# Patient Record
Sex: FEMALE | Race: WHITE | NOT HISPANIC OR LATINO
[De-identification: names, ages, dates, MRNs, and addresses within clinical notes are randomized per-mention and may not be internally consistent; named-entity substitution may affect disease eponyms.]

---

## 2020-05-11 ENCOUNTER — TRANSCRIPTION ENCOUNTER (OUTPATIENT)
Age: 55
End: 2020-05-11

## 2020-10-22 PROBLEM — Z00.00 ENCOUNTER FOR PREVENTIVE HEALTH EXAMINATION: Status: ACTIVE | Noted: 2020-10-22

## 2020-10-27 ENCOUNTER — APPOINTMENT (OUTPATIENT)
Dept: ORTHOPEDIC SURGERY | Facility: CLINIC | Age: 55
End: 2020-10-27
Payer: COMMERCIAL

## 2020-10-27 ENCOUNTER — TRANSCRIPTION ENCOUNTER (OUTPATIENT)
Age: 55
End: 2020-10-27

## 2020-10-27 DIAGNOSIS — M53.3 SACROCOCCYGEAL DISORDERS, NOT ELSEWHERE CLASSIFIED: ICD-10-CM

## 2020-10-27 DIAGNOSIS — Z78.9 OTHER SPECIFIED HEALTH STATUS: ICD-10-CM

## 2020-10-27 DIAGNOSIS — F17.200 NICOTINE DEPENDENCE, UNSPECIFIED, UNCOMPLICATED: ICD-10-CM

## 2020-10-27 DIAGNOSIS — M54.5 LOW BACK PAIN: ICD-10-CM

## 2020-10-27 PROCEDURE — 72100 X-RAY EXAM L-S SPINE 2/3 VWS: CPT

## 2020-10-27 PROCEDURE — 99072 ADDL SUPL MATRL&STAF TM PHE: CPT

## 2020-10-27 PROCEDURE — 99204 OFFICE O/P NEW MOD 45 MIN: CPT

## 2020-10-27 RX ORDER — PNV NO.95/FERROUS FUM/FOLIC AC 28MG-0.8MG
TABLET ORAL
Refills: 0 | Status: ACTIVE | COMMUNITY

## 2020-10-27 NOTE — DISCUSSION/SUMMARY
[de-identified] : Discussed findings of today's exam and possible causes of patient's pain.  Educated patient on their most probable diagnosis of chronic low back pain due to SI joint dysfunction and paraspinal muscle spasm.  Reviewed possible courses of treatment, and we collaboratively decided best course of treatment at this time will include conservative management.  Patient advised while she may have mild degenerative disc disease, she has no significant degenerative findings on x-ray, and no radiculopathy based on history or clinical exam.  Patient will be started on a course of physical therapy to restore normal range of motion and strength as tolerated.  We discussed appropriate activity modification and lifting mechanics.  If patient has persisting pain and localized SI joint dysfunction may consider diagnostic/therapeutic ultrasound-guided cortisone injection as a future treatment option.  Follow up as needed.  Patient appreciates and agrees with current plan.\par \par This note was generated using dragon medical dictation software.  A reasonable effort has been made for proofreading its contents, but typos may still remain.  If there are any questions or points of clarification needed please notify my office.

## 2020-10-27 NOTE — HISTORY OF PRESENT ILLNESS
[de-identified] : Patient is here for LBP that began about 2 years ago without inciting injury. She works as a  for [employer] and is constantly picking up heavy bags. She has pain in her back and into her left buttock. She has used Aleve, Tylenol, heat patches for pain relief. She was not working for several months due to the COVID-19 crisis and did not notice relief of her pain. Once she is moving, her pain is tolerable, but the initial getting up is what worsens her pain. She has not had any imaging or PT for this issue. Denies N/T/R/Prior injury. \par \par The patient's past medical history, past surgical history, medications and allergies were reviewed by me today and documented accordingly. In addition, the patient's family and social history, which were noncontributory to this visit, were reviewed also. Intake form was reviewed. The patient has no family history of arthritis.

## 2020-10-27 NOTE — PHYSICAL EXAM
[de-identified] : Constitutional: Well-nourished, well-developed, No acute distress\par Respiratory:  Good respiratory effort, no SOB\par Lymphatic: No regional lymphadenopathy, no lymphedema\par Psychiatric: Pleasant and normal affect, alert and oriented x3\par Skin: Clean dry and intact B/L UE/LE\par Musculoskeletal: normal except where as noted in regional exam\par \par Lumbar Spine Exam\par \par APPEARANCE: no marked deformities or malalignment, normal curvature of the lumbosacral spine. no marked deformities. good posture\par POSITIVE TENDERNESS: + Bilateral SI joints, bilateral lumbar tenderness and spasm noted in erector spinae and quadratus lumborum\par NONTENDER: no bony midline tenderness\par ROM: full, although painful at end range of flexion and extension\par RESISTIVE TESTING: painless 5/5 resisted flex/ext, sidebending b/l, and rotation\par SPECIAL TESTS: neg SLR b/l, neg RACHEL b/l, neg Trendelenburg b/l \par PULSES: 2+ DP/PT pulses\par NEURO:  L1 - S2 intact to sensation and motor, DTRs 2+/4 patella and achilles\par \par B/L Hips: No asymmetry, malalignment, or swelling, Full ROM, 5/5 strength in flexion/ext, IR/ER, Abd/Add, Joints stable\par B/L Knees: No asymmetry, malalignment, or swelling, Full ROM, 5/5 strength in flexion/ext, Joints stable\par B/L Ankles: No asymmetry, malalignment, or swelling, Full ROM, 5/5 strength in DF/PF/Inv/Ev, Joints stable\par BIOMECHANICAL EXAM: no marked leg length discrepancy, moderate hip abductor weakness b/l, no marked foot pronation, Normal gait and station\par \par  [de-identified] : The following radiographs were ordered and read by me during this patient's visit. I reviewed each radiograph in detail with the patient and discussed the findings as highlighted below. \par \par 2 views of the lumbar spine were obtained today that show degenerative changes and evidence of minimal osteoarthritis.  No fracture, or dislocation seen at this time. There is no malalignment. No other obvious osseous abnormality. Otherwise unremarkable.

## 2021-05-12 ENCOUNTER — TRANSCRIPTION ENCOUNTER (OUTPATIENT)
Age: 56
End: 2021-05-12

## 2021-06-15 ENCOUNTER — NON-APPOINTMENT (OUTPATIENT)
Age: 56
End: 2021-06-15

## 2021-06-15 ENCOUNTER — APPOINTMENT (OUTPATIENT)
Dept: CARDIOLOGY | Facility: CLINIC | Age: 56
End: 2021-06-15
Payer: COMMERCIAL

## 2021-06-15 VITALS
WEIGHT: 133 LBS | OXYGEN SATURATION: 99 % | BODY MASS INDEX: 24.48 KG/M2 | HEIGHT: 62 IN | DIASTOLIC BLOOD PRESSURE: 101 MMHG | HEART RATE: 74 BPM | SYSTOLIC BLOOD PRESSURE: 148 MMHG

## 2021-06-15 PROCEDURE — 99072 ADDL SUPL MATRL&STAF TM PHE: CPT

## 2021-06-15 PROCEDURE — 93000 ELECTROCARDIOGRAM COMPLETE: CPT

## 2021-06-15 PROCEDURE — 99204 OFFICE O/P NEW MOD 45 MIN: CPT

## 2021-06-15 NOTE — HISTORY OF PRESENT ILLNESS
[FreeTextEntry1] : Michelle is a 55-year-old female here for evaluation.  She is generally healthy, and does not take any medications.\par \par She is a current smoker, now smoking about half a pack per day.  She started when she was 16.  She reports a family history of congestive heart failure and coronary artery disease in her mother, though she passed away in her 90s.\par \par She is quite active at work, and works in the [employer].  She is always moving around and lifting heavy bags, and denies a decrease in exercise tolerance, dyspnea on exertion and chest pain.  She has no palpitations, lower extremity swelling, orthopnea, PND, dizziness, lightheadedness, and near syncope.\par \par She reports that her cholesterol was borderline elevated, though the results are not available.\par She works odd hours, and usually gets up at 2 AM, and at work by 4.  She does report a history of insomnia, and currently occasionally takes Tylenol PM and melatonin.

## 2021-06-15 NOTE — DISCUSSION/SUMMARY
[FreeTextEntry1] : Theodora is a 55-year-old female here for initial evaluation.  She has no worrisome cardiac symptoms at this time, though her blood pressure is quite elevated.  She does not seem excessively nervous or anxious.  Her EKG demonstrates a sinus rhythm, with an incomplete right bundle branch block, though no sign of ischemia.  Her physical exam is unremarkable.\par \par Given the elevation in her blood pressure, we have decided to start her on amlodipine 2.5.  She is going to purchase a blood pressure cuff, and monitor her numbers at home.  She will have a routine echocardiogram to confirm the absence of structural heart disease.  I stressed the importance of diet, exercise, and weight loss, to reduce her overall cardiovascular risk.  I requested a copy of her most recent cholesterol.  We also discussed the necessity of smoking cessation, though she is not ready to try medication like Chantix.  We will speak after the above testing, and arrange follow-up in 4 to 6 weeks.

## 2021-07-06 ENCOUNTER — APPOINTMENT (OUTPATIENT)
Dept: CARDIOLOGY | Facility: CLINIC | Age: 56
End: 2021-07-06
Payer: COMMERCIAL

## 2021-07-06 PROCEDURE — 99072 ADDL SUPL MATRL&STAF TM PHE: CPT

## 2021-07-06 PROCEDURE — 93306 TTE W/DOPPLER COMPLETE: CPT

## 2021-08-16 ENCOUNTER — TRANSCRIPTION ENCOUNTER (OUTPATIENT)
Age: 56
End: 2021-08-16

## 2021-08-16 LAB
25(OH)D3 SERPL-MCNC: 31 NG/ML
ALBUMIN SERPL ELPH-MCNC: 4.4 G/DL
ALP BLD-CCNC: 78 U/L
ALT SERPL-CCNC: 13 U/L
ANION GAP SERPL CALC-SCNC: 10 MMOL/L
AST SERPL-CCNC: 18 U/L
BASOPHILS # BLD AUTO: 0.05 K/UL
BASOPHILS NFR BLD AUTO: 0.6 %
BILIRUB SERPL-MCNC: 0.8 MG/DL
BUN SERPL-MCNC: 8 MG/DL
CALCIUM SERPL-MCNC: 9.7 MG/DL
CHLORIDE SERPL-SCNC: 104 MMOL/L
CHOLEST SERPL-MCNC: 223 MG/DL
CO2 SERPL-SCNC: 25 MMOL/L
CREAT SERPL-MCNC: 0.88 MG/DL
EOSINOPHIL # BLD AUTO: 0.14 K/UL
EOSINOPHIL NFR BLD AUTO: 1.7 %
GLUCOSE SERPL-MCNC: 89 MG/DL
HCT VFR BLD CALC: 45.5 %
HDLC SERPL-MCNC: 72 MG/DL
HGB BLD-MCNC: 15.1 G/DL
IMM GRANULOCYTES NFR BLD AUTO: 0.2 %
LDLC SERPL CALC-MCNC: 136 MG/DL
LYMPHOCYTES # BLD AUTO: 2.49 K/UL
LYMPHOCYTES NFR BLD AUTO: 30.4 %
MAN DIFF?: NORMAL
MCHC RBC-ENTMCNC: 32.1 PG
MCHC RBC-ENTMCNC: 33.2 GM/DL
MCV RBC AUTO: 96.6 FL
MONOCYTES # BLD AUTO: 0.69 K/UL
MONOCYTES NFR BLD AUTO: 8.4 %
NEUTROPHILS # BLD AUTO: 4.81 K/UL
NEUTROPHILS NFR BLD AUTO: 58.7 %
NONHDLC SERPL-MCNC: 150 MG/DL
PLATELET # BLD AUTO: 306 K/UL
POTASSIUM SERPL-SCNC: 5 MMOL/L
PROT SERPL-MCNC: 6.8 G/DL
RBC # BLD: 4.71 M/UL
RBC # FLD: 12.8 %
SODIUM SERPL-SCNC: 140 MMOL/L
TRIGL SERPL-MCNC: 72 MG/DL
TSH SERPL-ACNC: 0.55 UIU/ML
WBC # FLD AUTO: 8.2 K/UL

## 2021-08-18 LAB
ESTIMATED AVERAGE GLUCOSE: 111 MG/DL
HBA1C MFR BLD HPLC: 5.5 %

## 2021-08-25 ENCOUNTER — NON-APPOINTMENT (OUTPATIENT)
Age: 56
End: 2021-08-25

## 2021-08-25 ENCOUNTER — APPOINTMENT (OUTPATIENT)
Dept: CARDIOLOGY | Facility: CLINIC | Age: 56
End: 2021-08-25
Payer: COMMERCIAL

## 2021-08-25 VITALS
HEART RATE: 84 BPM | HEIGHT: 62 IN | WEIGHT: 132 LBS | BODY MASS INDEX: 24.29 KG/M2 | OXYGEN SATURATION: 99 % | SYSTOLIC BLOOD PRESSURE: 126 MMHG | DIASTOLIC BLOOD PRESSURE: 84 MMHG

## 2021-08-25 DIAGNOSIS — I65.29 OCCLUSION AND STENOSIS OF UNSPECIFIED CAROTID ARTERY: ICD-10-CM

## 2021-08-25 DIAGNOSIS — I10 ESSENTIAL (PRIMARY) HYPERTENSION: ICD-10-CM

## 2021-08-25 DIAGNOSIS — R94.31 ABNORMAL ELECTROCARDIOGRAM [ECG] [EKG]: ICD-10-CM

## 2021-08-25 DIAGNOSIS — E78.5 HYPERLIPIDEMIA, UNSPECIFIED: ICD-10-CM

## 2021-08-25 PROCEDURE — 93000 ELECTROCARDIOGRAM COMPLETE: CPT

## 2021-08-25 PROCEDURE — 99214 OFFICE O/P EST MOD 30 MIN: CPT

## 2021-08-25 NOTE — DISCUSSION/SUMMARY
[FreeTextEntry1] : Theodora is a 56-year-old female here for follow-up.  She feels well, and her blood pressure is much better controlled.  Her EKG is unchanged, without worrisome findings.  Her physical exam is unremarkable.\par \par She will continue amlodipine 5 for hypertension.  Her echocardiogram demonstrated normal left ventricular systolic function, without worrisome valvular pathology.  Most recent LDL was 136 and we discussed the need for improvement in her diet and exercise.  She is going to have a carotid Doppler, to evaluate for carotid atherosclerosis, especially given the fact that her mother had significant bilateral carotid disease.  She will consider calcium score in the near future for further risk stratification.\par \par We also discussed the necessity of smoking cessation, though she is not ready to try medication like Chantix.  We will speak after the above testing and arrange follow-up.

## 2021-08-25 NOTE — HISTORY OF PRESENT ILLNESS
[FreeTextEntry1] : Theodora is a 56-year-old female here for follow up.\par \par I last saw her in 6/21.\par \par Her BP is much better controlled on Norvasc 5 and she is feeling well.\par Echocardiogram demonstrated normal left ventricular systolic function, without significant valvular pathology.  Her LDL was 136 on recent blood work.\par \par She is a current smoker, now smoking about half a pack per day.  She started when she was 16.  She reports a family history of congestive heart failure and coronary artery disease in her mother, though she passed away in her 90s.\par \par She is quite active at work, and works in the [employer].  She is always moving around and lifting heavy bags, and denies a decrease in exercise tolerance, dyspnea on exertion and chest pain.  She has no palpitations, lower extremity swelling, orthopnea, PND, dizziness, lightheadedness, and near syncope.\par \par She works odd hours, and usually gets up at 2 AM, and at work by 4.  She does report a history of insomnia, and currently occasionally takes Tylenol PM and melatonin.

## 2021-09-20 ENCOUNTER — APPOINTMENT (OUTPATIENT)
Dept: CARDIOLOGY | Facility: CLINIC | Age: 56
End: 2021-09-20

## 2021-10-27 ENCOUNTER — NON-APPOINTMENT (OUTPATIENT)
Age: 56
End: 2021-10-27

## 2021-11-29 ENCOUNTER — APPOINTMENT (OUTPATIENT)
Dept: ORTHOPEDIC SURGERY | Facility: CLINIC | Age: 56
End: 2021-11-29

## 2021-12-10 ENCOUNTER — RX RENEWAL (OUTPATIENT)
Age: 56
End: 2021-12-10

## 2022-01-26 ENCOUNTER — RX RENEWAL (OUTPATIENT)
Age: 57
End: 2022-01-26

## 2022-08-23 ENCOUNTER — APPOINTMENT (OUTPATIENT)
Dept: ORTHOPEDIC SURGERY | Facility: CLINIC | Age: 57
End: 2022-08-23
Payer: COMMERCIAL

## 2022-08-23 DIAGNOSIS — M25.811 OTHER SPECIFIED JOINT DISORDERS, RIGHT SHOULDER: ICD-10-CM

## 2022-08-23 PROCEDURE — 99214 OFFICE O/P EST MOD 30 MIN: CPT | Mod: 25

## 2022-08-23 PROCEDURE — 73030 X-RAY EXAM OF SHOULDER: CPT | Mod: LT

## 2022-08-23 PROCEDURE — 20610 DRAIN/INJ JOINT/BURSA W/O US: CPT | Mod: RT

## 2022-08-23 NOTE — PHYSICAL EXAM
[Left] : left shoulder [NL (0-180)] : full passive forward flexion 0-180 degrees [NL (0-90)] : full external rotation 0-90 degrees [Normal Mood and Affect] : normal mood and affect [Orientated] : orientated [Able to Communicate] : able to communicate [Well Developed] : well developed [Well Nourished] : well nourished [Right] : right shoulder [There are no fractures, subluxations or dislocations. No significant abnormalities are seen] : There are no fractures, subluxations or dislocations. No significant abnormalities are seen [] : negative impingement testing [FreeTextEntry9] : IR T10 on the left, T12 on the right. [de-identified] : +Donte [FreeTextEntry1] : Type 2 acromion on the right, and 2-3 on the left. [TWNoteComboBox4] : passive forward flexion 170 degrees

## 2022-08-23 NOTE — HISTORY OF PRESENT ILLNESS
[Right Arm] : right arm [Gradual] : gradual [Sudden] : sudden [6] : 6 [5] : 5 [Burning] : burning [Shooting] : shooting [Stabbing] : stabbing [Intermittent] : intermittent [Exercising] : exercising [Full time] : Work status: full time [de-identified] : 08/23/22:  Return visit for this 57 year female here today for spontaneous onset of bilateral shoulder pain, right worse than left, for the last six months.  No injury.  Painful when raising her arm overhead and when lifting heavy items.  Takes Tylenol and Aleve for pain, which does help. \par \par PMH:  Nothing previous for her shoulders.  Osteoporosis. [] : Post Surgical Visit: no [FreeTextEntry1] : right shoulder  [FreeTextEntry5] : Pt has had shoulder pain for the past 6 months, pt works for an [employer] and says that lifting luggage has been bothering her shoulder lately, pt says that the right is worse than the left  [de-identified] : None  [de-identified] : works for [employer]

## 2022-09-17 ENCOUNTER — RX RENEWAL (OUTPATIENT)
Age: 57
End: 2022-09-17

## 2023-06-06 ENCOUNTER — RX RENEWAL (OUTPATIENT)
Age: 58
End: 2023-06-06

## 2023-07-03 ENCOUNTER — RX RENEWAL (OUTPATIENT)
Age: 58
End: 2023-07-03

## 2023-09-05 ENCOUNTER — RX RENEWAL (OUTPATIENT)
Age: 58
End: 2023-09-05

## 2023-09-05 RX ORDER — PIROXICAM 20 MG/1
20 CAPSULE ORAL
Qty: 30 | Refills: 5 | Status: ACTIVE | COMMUNITY
Start: 2022-08-23 | End: 1900-01-01

## 2023-12-23 ENCOUNTER — RX RENEWAL (OUTPATIENT)
Age: 58
End: 2023-12-23

## 2023-12-23 RX ORDER — AMLODIPINE BESYLATE 5 MG/1
5 TABLET ORAL
Qty: 30 | Refills: 5 | Status: ACTIVE | COMMUNITY
Start: 2021-06-15 | End: 1900-01-01

## 2024-04-23 ENCOUNTER — EMERGENCY (EMERGENCY)
Facility: HOSPITAL | Age: 59
LOS: 1 days | Discharge: HOME | End: 2024-04-23
Attending: EMERGENCY MEDICINE | Admitting: EMERGENCY MEDICINE
Payer: COMMERCIAL

## 2024-04-23 VITALS
OXYGEN SATURATION: 99 % | DIASTOLIC BLOOD PRESSURE: 91 MMHG | RESPIRATION RATE: 16 BRPM | SYSTOLIC BLOOD PRESSURE: 152 MMHG | HEART RATE: 70 BPM

## 2024-04-23 VITALS
RESPIRATION RATE: 16 BRPM | TEMPERATURE: 98 F | SYSTOLIC BLOOD PRESSURE: 180 MMHG | DIASTOLIC BLOOD PRESSURE: 114 MMHG | HEIGHT: 62 IN | HEART RATE: 69 BPM | WEIGHT: 132.94 LBS | OXYGEN SATURATION: 98 %

## 2024-04-23 PROCEDURE — 73502 X-RAY EXAM HIP UNI 2-3 VIEWS: CPT | Mod: 26,RT

## 2024-04-23 PROCEDURE — 99284 EMERGENCY DEPT VISIT MOD MDM: CPT

## 2024-04-23 PROCEDURE — 73552 X-RAY EXAM OF FEMUR 2/>: CPT | Mod: 26,RT

## 2024-04-23 PROCEDURE — 73552 X-RAY EXAM OF FEMUR 2/>: CPT

## 2024-04-23 PROCEDURE — 73502 X-RAY EXAM HIP UNI 2-3 VIEWS: CPT

## 2024-04-23 RX ORDER — ROSUVASTATIN CALCIUM 5 MG/1
1 TABLET ORAL
Refills: 0 | DISCHARGE

## 2024-04-23 RX ORDER — AMLODIPINE BESYLATE 2.5 MG/1
5 TABLET ORAL ONCE
Refills: 0 | Status: COMPLETED | OUTPATIENT
Start: 2024-04-23 | End: 2024-04-23

## 2024-04-23 RX ORDER — AMLODIPINE BESYLATE 2.5 MG/1
1 TABLET ORAL
Refills: 0 | DISCHARGE

## 2024-04-23 RX ORDER — ACETAMINOPHEN 500 MG
975 TABLET ORAL ONCE
Refills: 0 | Status: COMPLETED | OUTPATIENT
Start: 2024-04-23 | End: 2024-04-23

## 2024-04-23 RX ADMIN — AMLODIPINE BESYLATE 5 MILLIGRAM(S): 2.5 TABLET ORAL at 16:35

## 2024-04-23 RX ADMIN — Medication 975 MILLIGRAM(S): at 16:35

## 2024-04-23 NOTE — ED PROVIDER NOTE - CLINICAL SUMMARY MEDICAL DECISION MAKING FREE TEXT BOX
58 female c/o right groin pain s/p trip and fall, f/u xrays, patient took ibuprofen prior to arrival, will get tylneol, noted blood pressure elevated, will get her norsvasc and re-evaluate

## 2024-04-23 NOTE — ED ADULT TRIAGE NOTE - CHIEF COMPLAINT QUOTE
" I tripped on a luggage while at work today. My right hip and pelvis hurts " Pt took 2 tablets of Ibuprofen at 130 pm

## 2024-04-23 NOTE — ED PROVIDER NOTE - OBJECTIVE STATEMENT
58-year-old female PMH of HTN on amlodipine, presents to the emergency department with her daughter, states that while at work she was moving luggage and fell on her right side, and complaining of right groin pain, worse with movement, better with laying still.  Patient states that she did not have a chance to take her blood pressure medication today.  Patient denies any head injury, loss of consciousness, and no other injury.

## 2024-04-23 NOTE — ED ADULT NURSE NOTE - NSFALLHARMRISKINTERV_ED_ALL_ED
Assistance OOB with selected safe patient handling equipment if applicable/Assistance with ambulation/Communicate risk of Fall with Harm to all staff, patient, and family/Monitor gait and stability/Provide visual cue: red socks, yellow wristband, yellow gown, etc/Reinforce activity limits and safety measures with patient and family/Bed in lowest position, wheels locked, appropriate side rails in place/Call bell, personal items and telephone in reach/Instruct patient to call for assistance before getting out of bed/chair/stretcher/Non-slip footwear applied when patient is off stretcher/[place] to call system/Physically safe environment - no spills, clutter or unnecessary equipment/Purposeful Proactive Rounding/Room/bathroom lighting operational, light cord in reach

## 2024-04-23 NOTE — ED PROVIDER NOTE - PROGRESS NOTE DETAILS
Patient told no obvious fracture on x-rays, has pain to the groin area, patient told to follow-up with outpatient orthopedics for clearance back to work, patient states she has muscle relaxers at home, will prescribe naproxen

## 2024-04-23 NOTE — ED ADULT NURSE NOTE - OBJECTIVE STATEMENT
58yF presents to the ED with reports of R hip pain. PMHx of HTN. pt states she was at work today when she was tripped over a luggage and landed on her R hip. pt denies head trauma/LOC. pt reports she has had difficulty bearing weight without pain s/p fall. pt reports pain primarily on R hip radiating to R groin. also reports pain to R pelvis. denies pain at rest, reports pain with bearing weight. pt denies numbness/tingling, CP, SOB, N/V, abdominal pain, lower back pain, urinary symptoms, HA, vision changes. pt took Motrin prior to arrival at 1pm.

## 2024-04-23 NOTE — ED PROVIDER NOTE - PATIENT PORTAL LINK FT
You can access the FollowMyHealth Patient Portal offered by [facility] by registering at the following website: [email or web address]. By joining [employer]’s FollowMyHealth portal, you will also be able to view your health information using other applications (apps) compatible with our system. 2

## 2024-04-23 NOTE — ED PROVIDER NOTE - NSFOLLOWUPINSTRUCTIONS_ED_ALL_ED_FT
Follow-up with orthopedics, call to make an appointment. May continue to take your muscle relaxers as prescribed by your physician, recommend taking naproxen 500 mg orally twice daily as needed for pain.  Keep your right leg elevated, may apply ice packs to the area of pain leaving it on for 20 minutes and may repeat every 1-2 hours as needed.  May ambulate as tolerated.  No heavy lifting, no strenuous activity.  Return to the emergency department if having severe pain and/or worsening of symptoms.

## 2024-04-23 NOTE — ED PROVIDER NOTE - CARE PROVIDER_API CALL
Ceferino Marte  Orthopaedic Surgery  [address], [address]  [place] [address]  Phone: [phone]  Fax: [phone]  Follow Up Time:

## 2024-04-24 ENCOUNTER — APPOINTMENT (OUTPATIENT)
Dept: MRI IMAGING | Facility: CLINIC | Age: 59
End: 2024-04-24
Payer: OTHER MISCELLANEOUS

## 2024-04-24 ENCOUNTER — APPOINTMENT (OUTPATIENT)
Dept: ORTHOPEDIC SURGERY | Facility: CLINIC | Age: 59
End: 2024-04-24
Payer: OTHER MISCELLANEOUS

## 2024-04-24 VITALS — BODY MASS INDEX: 24.29 KG/M2 | HEIGHT: 62 IN | WEIGHT: 132 LBS

## 2024-04-24 DIAGNOSIS — I10 ESSENTIAL (PRIMARY) HYPERTENSION: ICD-10-CM

## 2024-04-24 DIAGNOSIS — F17.200 NICOTINE DEPENDENCE, UNSPECIFIED, UNCOMPLICATED: ICD-10-CM

## 2024-04-24 PROBLEM — Z00.00 ENCOUNTER FOR PREVENTIVE HEALTH EXAMINATION: Status: ACTIVE | Noted: 2024-04-24

## 2024-04-24 PROCEDURE — 73721 MRI JNT OF LWR EXTRE W/O DYE: CPT | Mod: RT

## 2024-04-24 PROCEDURE — E0114: CPT | Mod: ACP

## 2024-04-24 PROCEDURE — 99203 OFFICE O/P NEW LOW 30 MIN: CPT | Mod: ACP

## 2024-04-24 RX ORDER — ASPIRIN 81 MG
81 TABLET, DELAYED RELEASE (ENTERIC COATED) ORAL
Refills: 0 | Status: ACTIVE | COMMUNITY

## 2024-04-24 RX ORDER — ROSUVASTATIN CALCIUM 10 MG/1
10 TABLET, FILM COATED ORAL
Refills: 0 | Status: ACTIVE | COMMUNITY

## 2024-04-24 RX ORDER — AMLODIPINE BESYLATE 5 MG/1
5 TABLET ORAL
Refills: 0 | Status: ACTIVE | COMMUNITY

## 2024-04-24 NOTE — IMAGING
[de-identified] : R hip no swelling or bruising noted ttp groin region mild pain with groin flexion and internal rotation no pain with hip log rolling strength 4/5 neuro intact antalgic gait

## 2024-04-24 NOTE — HISTORY OF PRESENT ILLNESS
[Work related] : work related [Sudden] : sudden [Dull/Aching] : dull/aching [Localized] : localized [Frequent] : frequent [Standing] : standing [Walking] : walking [Stairs] : stairs [Not working due to injury] : Work status: not working due to injury [de-identified] : WC 4/23/24 59yo F here for right hip pain that began after she tripped over a suitcase. Went to [facility] yesterday, had XR which were negative. Was given Naproxen which does not help. Occupation:  for [employer], [employer] [] : no [FreeTextEntry3] : 4/23/24

## 2024-04-24 NOTE — ASSESSMENT
[FreeTextEntry1] : XR [facility] ER negative for dispalced fx I recommend STAT MRI R hip to r/o occult fx PWB with crutches ice, nsaidss, tylenol fu Jacoby tomorrow for MRI review

## 2024-04-25 ENCOUNTER — NON-APPOINTMENT (OUTPATIENT)
Age: 59
End: 2024-04-25

## 2024-04-25 ENCOUNTER — APPOINTMENT (OUTPATIENT)
Dept: ORTHOPEDIC SURGERY | Facility: CLINIC | Age: 59
End: 2024-04-25

## 2024-04-25 NOTE — ED POST DISCHARGE NOTE - DETAILS
pt saw katherine and mariano yesterday had repeat x rays without acute findings had an mri pending results.

## 2024-04-26 ENCOUNTER — APPOINTMENT (OUTPATIENT)
Dept: ORTHOPEDIC SURGERY | Facility: CLINIC | Age: 59
End: 2024-04-26
Payer: OTHER MISCELLANEOUS

## 2024-04-26 DIAGNOSIS — M25.551 PAIN IN RIGHT HIP: ICD-10-CM

## 2024-04-26 PROCEDURE — 99213 OFFICE O/P EST LOW 20 MIN: CPT

## 2024-04-26 PROCEDURE — E0114: CPT | Mod: NU

## 2024-04-26 NOTE — IMAGING
[de-identified] : R hip no swelling or bruising noted ttp groin region mild pain with groin flexion and internal rotation no pain with hip log rolling strength 4/5 neuro intact antalgic gait

## 2024-04-26 NOTE — HISTORY OF PRESENT ILLNESS
[de-identified] : WC 4/23/24 Previous Note: 57yo F here for right hip pain that began after she tripped over a suitcase. Went to [facility] yesterday, had XR which were negative. Was given Naproxen which does not help. Occupation:  for [employer], [employer]  4/26/24 f/u MRI right hip

## 2024-04-26 NOTE — ASSESSMENT
[FreeTextEntry1] : MRI R hip reviewed, posterior wall acetabulum fx, nondisplaced amenable to nonoperative mgmt TTWB with crutches return 4 weeks

## 2024-04-30 RX ORDER — TRAMADOL HYDROCHLORIDE 50 MG/1
50 TABLET, COATED ORAL
Qty: 21 | Refills: 0 | Status: ACTIVE | COMMUNITY
Start: 2024-04-30 | End: 1900-01-01

## 2024-05-09 ENCOUNTER — RX RENEWAL (OUTPATIENT)
Age: 59
End: 2024-05-09

## 2024-05-09 RX ORDER — ROSUVASTATIN CALCIUM 10 MG/1
10 TABLET, FILM COATED ORAL
Qty: 30 | Refills: 5 | Status: ACTIVE | COMMUNITY
Start: 2021-09-29 | End: 1900-01-01

## 2024-05-22 ENCOUNTER — APPOINTMENT (OUTPATIENT)
Dept: ORTHOPEDIC SURGERY | Facility: CLINIC | Age: 59
End: 2024-05-22
Payer: OTHER MISCELLANEOUS

## 2024-05-22 PROCEDURE — 73503 X-RAY EXAM HIP UNI 4/> VIEWS: CPT | Mod: RT

## 2024-05-22 PROCEDURE — 99213 OFFICE O/P EST LOW 20 MIN: CPT

## 2024-05-29 RX ORDER — NAPROXEN 500 MG/1
500 TABLET ORAL
Qty: 60 | Refills: 0 | Status: ACTIVE | COMMUNITY
Start: 2024-05-29 | End: 1900-01-01

## 2024-06-26 ENCOUNTER — APPOINTMENT (OUTPATIENT)
Dept: ORTHOPEDIC SURGERY | Facility: CLINIC | Age: 59
End: 2024-06-26
Payer: OTHER MISCELLANEOUS

## 2024-06-26 DIAGNOSIS — S32.424A NONDISPLACED FRACTURE OF POSTERIOR WALL OF RIGHT ACETABULUM, INITIAL ENCOUNTER FOR CLOSED FRACTURE: ICD-10-CM

## 2024-06-26 PROCEDURE — 73502 X-RAY EXAM HIP UNI 2-3 VIEWS: CPT

## 2024-06-26 PROCEDURE — 99214 OFFICE O/P EST MOD 30 MIN: CPT

## 2024-08-07 ENCOUNTER — APPOINTMENT (OUTPATIENT)
Dept: ORTHOPEDIC SURGERY | Facility: CLINIC | Age: 59
End: 2024-08-07

## 2024-08-07 PROCEDURE — 73503 X-RAY EXAM HIP UNI 4/> VIEWS: CPT | Mod: LT

## 2024-08-07 PROCEDURE — 99214 OFFICE O/P EST MOD 30 MIN: CPT

## 2024-08-07 NOTE — ASSESSMENT
[FreeTextEntry1] : New displacement on XR Back to PWB with crutches f/u MRI and CT R hip return after imaging   The patient was advised of the diagnosis. The natural history of the pathology was explained in full to the patient in layman's terms. All questions were answered. The risks and benefits of surgical and non-surgical treatment alternatives were explained in full to the patient.

## 2024-08-07 NOTE — HISTORY OF PRESENT ILLNESS
[de-identified] : WC 4/23/24 Previous Note: 59yo F here for right hip pain that began after she tripped over a suitcase. Went to [facility] yesterday, had XR which were negative. Was given Naproxen which does not help. Occupation:  for [employer], [employer]  4/26/24 f/u MRI right hip 05/22/24 - pt is here for fu on RT hip / pelvis. pt states she still has pain but she is doing fine. compliant with WB restrictions with crutches  6/26/24: here for F/U on rt hip/ pelvis. pt states pain is about 50/50 but is doing fine. WB with crutches and sneakers  08/07/24 - Here for follow up. She has started PT which seems to help. There is continued pulling pain in her groin.

## 2024-08-08 ENCOUNTER — APPOINTMENT (OUTPATIENT)
Dept: CT IMAGING | Facility: CLINIC | Age: 59
End: 2024-08-08

## 2024-08-08 ENCOUNTER — APPOINTMENT (OUTPATIENT)
Dept: MRI IMAGING | Facility: CLINIC | Age: 59
End: 2024-08-08

## 2024-08-08 PROCEDURE — 73700 CT LOWER EXTREMITY W/O DYE: CPT | Mod: RT

## 2024-08-08 PROCEDURE — 73721 MRI JNT OF LWR EXTRE W/O DYE: CPT | Mod: RT

## 2024-08-08 PROCEDURE — 76376 3D RENDER W/INTRP POSTPROCES: CPT | Mod: RT

## 2024-08-14 ENCOUNTER — APPOINTMENT (OUTPATIENT)
Dept: ORTHOPEDIC SURGERY | Facility: CLINIC | Age: 59
End: 2024-08-14
Payer: OTHER MISCELLANEOUS

## 2024-08-14 DIAGNOSIS — S32.424A NONDISPLACED FRACTURE OF POSTERIOR WALL OF RIGHT ACETABULUM, INITIAL ENCOUNTER FOR CLOSED FRACTURE: ICD-10-CM

## 2024-08-14 PROCEDURE — 99213 OFFICE O/P EST LOW 20 MIN: CPT

## 2024-08-14 NOTE — ASSESSMENT
[FreeTextEntry1] : MRI and CT reviewed, femoral head still centered and fracture aligned acceptably recommend continued PWB, will plan for repeat CT  return 8 weeks   The patient was advised of the diagnosis. The natural history of the pathology was explained in full to the patient in layman's terms. All questions were answered. The risks and benefits of surgical and non-surgical treatment alternatives were explained in full to the patient.

## 2024-08-14 NOTE — HISTORY OF PRESENT ILLNESS
[de-identified] : WC 4/23/24 Previous Note: 59yo F here for right hip pain that began after she tripped over a suitcase. Went to [facility] yesterday, had XR which were negative. Was given Naproxen which does not help. Occupation:  for [employer], [employer]  4/26/24 f/u MRI right hip 05/22/24 - pt is here for fu on RT hip / pelvis. pt states she still has pain but she is doing fine. compliant with WB restrictions with crutches  6/26/24: here for F/U on rt hip/ pelvis. pt states pain is about 50/50 but is doing fine. WB with crutches and sneakers  08/07/24 - Here for follow up. She has started PT which seems to help. There is continued pulling pain in her groin.  08/14/2024  :BARBARA LY, a58 year old female, presents today for right hip / pelvis, pt states hip doing okay, no changes.

## 2024-09-13 ENCOUNTER — RX RENEWAL (OUTPATIENT)
Age: 59
End: 2024-09-13

## 2024-09-18 ENCOUNTER — APPOINTMENT (OUTPATIENT)
Dept: ORTHOPEDIC SURGERY | Facility: CLINIC | Age: 59
End: 2024-09-18
Payer: OTHER MISCELLANEOUS

## 2024-09-18 DIAGNOSIS — S32.424A NONDISPLACED FRACTURE OF POSTERIOR WALL OF RIGHT ACETABULUM, INITIAL ENCOUNTER FOR CLOSED FRACTURE: ICD-10-CM

## 2024-09-18 PROCEDURE — 99214 OFFICE O/P EST MOD 30 MIN: CPT

## 2024-09-18 PROCEDURE — 73503 X-RAY EXAM HIP UNI 4/> VIEWS: CPT | Mod: RT

## 2024-09-18 RX ORDER — NAPROXEN 500 MG/1
500 TABLET ORAL
Qty: 60 | Refills: 1 | Status: ACTIVE | COMMUNITY
Start: 2024-09-18 | End: 1900-01-01

## 2024-09-18 NOTE — ASSESSMENT
[FreeTextEntry1] : MRI and CT reviewed, femoral head still centered and fracture aligned acceptably recommend continued PWB return 8 weeks   The patient was advised of the diagnosis. The natural history of the pathology was explained in full to the patient in layman's terms. All questions were answered. The risks and benefits of surgical and non-surgical treatment alternatives were explained in full to the patient.

## 2024-09-18 NOTE — HISTORY OF PRESENT ILLNESS
[de-identified] : WC 4/23/24 Previous Note: 59yo F here for right hip pain that began after she tripped over a suitcase. Went to [facility] yesterday, had XR which were negative. Was given Naproxen which does not help. Occupation:  for [employer], [employer]  4/26/24 f/u MRI right hip 05/22/24 - pt is here for fu on RT hip / pelvis. pt states she still has pain but she is doing fine. compliant with WB restrictions with crutches 6/26/24: here for F/U on rt hip/ pelvis. pt states pain is about 50/50 but is doing fine. WB with crutches and sneakers 08/07/24 - Here for follow up. She has started PT which seems to help. There is continued pulling pain in her groin.  08/14/2024  :BARBARA LY, a58 year old female, presents today for right hip / pelvis, pt states hip doing okay, no changes.   09/17/24 - pt is here for right hip / pelvis, pt still walks in crutches / discomfort in pelvis without crutches, states pain is the same from last visit, no changes.

## 2024-11-06 ENCOUNTER — APPOINTMENT (OUTPATIENT)
Dept: ORTHOPEDIC SURGERY | Facility: CLINIC | Age: 59
End: 2024-11-06
Payer: OTHER MISCELLANEOUS

## 2024-11-06 DIAGNOSIS — S32.424A NONDISPLACED FRACTURE OF POSTERIOR WALL OF RIGHT ACETABULUM, INITIAL ENCOUNTER FOR CLOSED FRACTURE: ICD-10-CM

## 2024-11-06 PROCEDURE — 99213 OFFICE O/P EST LOW 20 MIN: CPT

## 2024-11-06 PROCEDURE — 72170 X-RAY EXAM OF PELVIS: CPT

## 2024-12-18 ENCOUNTER — APPOINTMENT (OUTPATIENT)
Dept: ORTHOPEDIC SURGERY | Facility: CLINIC | Age: 59
End: 2024-12-18
Payer: OTHER MISCELLANEOUS

## 2024-12-18 DIAGNOSIS — S32.424A NONDISPLACED FRACTURE OF POSTERIOR WALL OF RIGHT ACETABULUM, INITIAL ENCOUNTER FOR CLOSED FRACTURE: ICD-10-CM

## 2024-12-18 PROCEDURE — 72170 X-RAY EXAM OF PELVIS: CPT

## 2024-12-18 PROCEDURE — 99213 OFFICE O/P EST LOW 20 MIN: CPT

## 2025-01-10 ENCOUNTER — RX RENEWAL (OUTPATIENT)
Age: 60
End: 2025-01-10

## 2025-01-29 ENCOUNTER — APPOINTMENT (OUTPATIENT)
Dept: ORTHOPEDIC SURGERY | Facility: CLINIC | Age: 60
End: 2025-01-29
Payer: OTHER MISCELLANEOUS

## 2025-01-29 DIAGNOSIS — S32.424A NONDISPLACED FRACTURE OF POSTERIOR WALL OF RIGHT ACETABULUM, INITIAL ENCOUNTER FOR CLOSED FRACTURE: ICD-10-CM

## 2025-01-29 PROCEDURE — 99214 OFFICE O/P EST MOD 30 MIN: CPT

## 2025-01-29 PROCEDURE — 73503 X-RAY EXAM HIP UNI 4/> VIEWS: CPT | Mod: RT

## 2025-01-31 ENCOUNTER — RX RENEWAL (OUTPATIENT)
Age: 60
End: 2025-01-31

## 2025-03-24 ENCOUNTER — RX RENEWAL (OUTPATIENT)
Age: 60
End: 2025-03-24

## 2025-03-26 ENCOUNTER — APPOINTMENT (OUTPATIENT)
Dept: ORTHOPEDIC SURGERY | Facility: CLINIC | Age: 60
End: 2025-03-26
Payer: OTHER MISCELLANEOUS

## 2025-03-26 DIAGNOSIS — S32.424A NONDISPLACED FRACTURE OF POSTERIOR WALL OF RIGHT ACETABULUM, INITIAL ENCOUNTER FOR CLOSED FRACTURE: ICD-10-CM

## 2025-03-26 PROCEDURE — 73502 X-RAY EXAM HIP UNI 2-3 VIEWS: CPT

## 2025-03-26 PROCEDURE — 99214 OFFICE O/P EST MOD 30 MIN: CPT

## 2025-04-11 ENCOUNTER — RESULT REVIEW (OUTPATIENT)
Age: 60
End: 2025-04-11

## 2025-05-14 ENCOUNTER — APPOINTMENT (OUTPATIENT)
Dept: ORTHOPEDIC SURGERY | Facility: CLINIC | Age: 60
End: 2025-05-14
Payer: OTHER MISCELLANEOUS

## 2025-05-14 DIAGNOSIS — S32.424A NONDISPLACED FRACTURE OF POSTERIOR WALL OF RIGHT ACETABULUM, INITIAL ENCOUNTER FOR CLOSED FRACTURE: ICD-10-CM

## 2025-05-14 PROCEDURE — 99214 OFFICE O/P EST MOD 30 MIN: CPT

## 2025-06-19 ENCOUNTER — APPOINTMENT (OUTPATIENT)
Dept: INTERNAL MEDICINE | Facility: CLINIC | Age: 60
End: 2025-06-19
Payer: COMMERCIAL

## 2025-06-19 ENCOUNTER — APPOINTMENT (OUTPATIENT)
Dept: INTERNAL MEDICINE | Facility: CLINIC | Age: 60
End: 2025-06-19

## 2025-06-19 VITALS
SYSTOLIC BLOOD PRESSURE: 122 MMHG | HEART RATE: 82 BPM | HEIGHT: 62 IN | TEMPERATURE: 98.4 F | BODY MASS INDEX: 25.95 KG/M2 | OXYGEN SATURATION: 96 % | DIASTOLIC BLOOD PRESSURE: 80 MMHG | WEIGHT: 141 LBS | RESPIRATION RATE: 12 BRPM

## 2025-06-19 PROBLEM — Z87.891 FORMER SMOKER: Status: ACTIVE | Noted: 2025-06-19

## 2025-06-19 PROBLEM — E55.9 VITAMIN D DEFICIENCY: Status: ACTIVE | Noted: 2025-06-19

## 2025-06-19 PROBLEM — M81.0 OSTEOPOROSIS, UNSPECIFIED OSTEOPOROSIS TYPE, UNSPECIFIED PATHOLOGICAL FRACTURE PRESENCE: Status: ACTIVE | Noted: 2025-06-19

## 2025-06-19 PROBLEM — Z78.9 SOCIAL ALCOHOL USE: Status: ACTIVE | Noted: 2025-06-19

## 2025-06-19 PROCEDURE — 36415 COLL VENOUS BLD VENIPUNCTURE: CPT

## 2025-06-19 PROCEDURE — 99386 PREV VISIT NEW AGE 40-64: CPT

## 2025-06-19 PROCEDURE — 93000 ELECTROCARDIOGRAM COMPLETE: CPT

## 2025-06-19 RX ORDER — DENOSUMAB 60 MG/ML
60 INJECTION SUBCUTANEOUS
Refills: 0 | Status: ACTIVE | COMMUNITY

## 2025-06-19 RX ORDER — CHLORHEXIDINE GLUCONATE 4 %
1000 LIQUID (ML) TOPICAL
Refills: 0 | Status: ACTIVE | COMMUNITY

## 2025-06-19 RX ORDER — MULTIVIT-MIN/IRON/FOLIC ACID/K 18-600-40
CAPSULE ORAL
Refills: 0 | Status: ACTIVE | COMMUNITY

## 2025-06-19 RX ORDER — MELATONIN 3 MG
25 MCG TABLET ORAL
Refills: 0 | Status: ACTIVE | COMMUNITY

## 2025-06-19 RX ORDER — BIOTIN 10 MG
TABLET ORAL
Refills: 0 | Status: ACTIVE | COMMUNITY

## 2025-06-24 PROBLEM — D72.829 LEUKOCYTOSIS, UNSPECIFIED TYPE: Status: ACTIVE | Noted: 2025-06-24

## 2025-06-24 LAB
25(OH)D3 SERPL-MCNC: 40.5 NG/ML
ALBUMIN SERPL ELPH-MCNC: 4.4 G/DL
ALP BLD-CCNC: 121 U/L
ALT SERPL-CCNC: 27 U/L
ANION GAP SERPL CALC-SCNC: 16 MMOL/L
APPEARANCE: CLEAR
AST SERPL-CCNC: 25 U/L
BACTERIA: NEGATIVE /HPF
BASOPHILS # BLD AUTO: 0.05 K/UL
BASOPHILS NFR BLD AUTO: 0.4 %
BILIRUB SERPL-MCNC: 0.6 MG/DL
BILIRUBIN URINE: NEGATIVE
BLOOD URINE: ABNORMAL
BUN SERPL-MCNC: 10 MG/DL
CALCIUM SERPL-MCNC: 9.7 MG/DL
CAST: 0 /LPF
CHLORIDE SERPL-SCNC: 102 MMOL/L
CHOLEST SERPL-MCNC: 173 MG/DL
CO2 SERPL-SCNC: 22 MMOL/L
COLOR: YELLOW
CREAT SERPL-MCNC: 0.71 MG/DL
EGFRCR SERPLBLD CKD-EPI 2021: 97 ML/MIN/1.73M2
EOSINOPHIL # BLD AUTO: 0.13 K/UL
EOSINOPHIL NFR BLD AUTO: 1.1 %
EPITHELIAL CELLS: 1 /HPF
ESTIMATED AVERAGE GLUCOSE: 114 MG/DL
GLUCOSE QUALITATIVE U: NEGATIVE MG/DL
GLUCOSE SERPL-MCNC: 91 MG/DL
HBA1C MFR BLD HPLC: 5.6 %
HCT VFR BLD CALC: 47.3 %
HDLC SERPL-MCNC: 64 MG/DL
HGB BLD-MCNC: 15.4 G/DL
IMM GRANULOCYTES NFR BLD AUTO: 0.4 %
KETONES URINE: NEGATIVE MG/DL
LDLC SERPL-MCNC: 87 MG/DL
LEUKOCYTE ESTERASE URINE: NEGATIVE
LYMPHOCYTES # BLD AUTO: 2.48 K/UL
LYMPHOCYTES NFR BLD AUTO: 21.1 %
MAN DIFF?: NORMAL
MCHC RBC-ENTMCNC: 31.9 PG
MCHC RBC-ENTMCNC: 32.6 G/DL
MCV RBC AUTO: 97.9 FL
MICROSCOPIC-UA: NORMAL
MONOCYTES # BLD AUTO: 0.81 K/UL
MONOCYTES NFR BLD AUTO: 6.9 %
NEUTROPHILS # BLD AUTO: 8.26 K/UL
NEUTROPHILS NFR BLD AUTO: 70.1 %
NITRITE URINE: NEGATIVE
NONHDLC SERPL-MCNC: 109 MG/DL
PH URINE: 7
PLATELET # BLD AUTO: 361 K/UL
POTASSIUM SERPL-SCNC: 4.6 MMOL/L
PROT SERPL-MCNC: 7.3 G/DL
PROTEIN URINE: NEGATIVE MG/DL
RBC # BLD: 4.83 M/UL
RBC # FLD: 13.5 %
RED BLOOD CELLS URINE: 0 /HPF
SODIUM SERPL-SCNC: 140 MMOL/L
SPECIFIC GRAVITY URINE: 1.01
T4 FREE SERPL-MCNC: 1.4 NG/DL
TRIGL SERPL-MCNC: 127 MG/DL
TSH SERPL-ACNC: 0.66 UIU/ML
UROBILINOGEN URINE: 0.2 MG/DL
VIT B12 SERPL-MCNC: 1913 PG/ML
WBC # FLD AUTO: 11.78 K/UL
WHITE BLOOD CELLS URINE: 0 /HPF

## 2025-06-26 ENCOUNTER — NON-APPOINTMENT (OUTPATIENT)
Age: 60
End: 2025-06-26

## 2025-07-09 ENCOUNTER — APPOINTMENT (OUTPATIENT)
Dept: ORTHOPEDIC SURGERY | Facility: CLINIC | Age: 60
End: 2025-07-09
Payer: OTHER MISCELLANEOUS

## 2025-07-09 VITALS — HEIGHT: 62 IN | BODY MASS INDEX: 25.95 KG/M2 | WEIGHT: 141 LBS

## 2025-07-09 PROBLEM — S32.401S: Status: ACTIVE | Noted: 2025-06-19

## 2025-07-09 PROCEDURE — 99214 OFFICE O/P EST MOD 30 MIN: CPT

## 2025-07-14 ENCOUNTER — TRANSCRIPTION ENCOUNTER (OUTPATIENT)
Age: 60
End: 2025-07-14

## 2025-07-17 ENCOUNTER — TRANSCRIPTION ENCOUNTER (OUTPATIENT)
Age: 60
End: 2025-07-17

## 2025-08-15 ENCOUNTER — RX RENEWAL (OUTPATIENT)
Age: 60
End: 2025-08-15

## 2025-09-03 ENCOUNTER — APPOINTMENT (OUTPATIENT)
Dept: ORTHOPEDIC SURGERY | Facility: CLINIC | Age: 60
End: 2025-09-03
Payer: OTHER MISCELLANEOUS

## 2025-09-03 DIAGNOSIS — S32.401S: ICD-10-CM

## 2025-09-03 DIAGNOSIS — S32.424A NONDISPLACED FRACTURE OF POSTERIOR WALL OF RIGHT ACETABULUM, INITIAL ENCOUNTER FOR CLOSED FRACTURE: ICD-10-CM

## 2025-09-03 PROCEDURE — 99213 OFFICE O/P EST LOW 20 MIN: CPT
